# Patient Record
Sex: FEMALE | Race: WHITE | NOT HISPANIC OR LATINO | Employment: FULL TIME | URBAN - METROPOLITAN AREA
[De-identification: names, ages, dates, MRNs, and addresses within clinical notes are randomized per-mention and may not be internally consistent; named-entity substitution may affect disease eponyms.]

---

## 2024-07-01 ENCOUNTER — MEDICAL CORRESPONDENCE (OUTPATIENT)
Dept: HEALTH INFORMATION MANAGEMENT | Facility: CLINIC | Age: 34
End: 2024-07-01
Payer: COMMERCIAL

## 2024-07-02 ENCOUNTER — TRANSCRIBE ORDERS (OUTPATIENT)
Dept: OTHER | Age: 34
End: 2024-07-02

## 2024-07-02 DIAGNOSIS — I73.00 RAYNAUD'S DISEASE WITHOUT GANGRENE: Primary | ICD-10-CM

## 2024-10-06 ENCOUNTER — HEALTH MAINTENANCE LETTER (OUTPATIENT)
Age: 34
End: 2024-10-06

## 2024-10-18 ENCOUNTER — OFFICE VISIT (OUTPATIENT)
Dept: RHEUMATOLOGY | Facility: CLINIC | Age: 34
End: 2024-10-18
Attending: STUDENT IN AN ORGANIZED HEALTH CARE EDUCATION/TRAINING PROGRAM
Payer: COMMERCIAL

## 2024-10-18 VITALS
HEIGHT: 57 IN | WEIGHT: 158 LBS | TEMPERATURE: 97.1 F | DIASTOLIC BLOOD PRESSURE: 109 MMHG | SYSTOLIC BLOOD PRESSURE: 146 MMHG | BODY MASS INDEX: 34.09 KG/M2 | HEART RATE: 92 BPM | OXYGEN SATURATION: 97 %

## 2024-10-18 DIAGNOSIS — I73.00 RAYNAUD'S DISEASE WITHOUT GANGRENE: ICD-10-CM

## 2024-10-18 DIAGNOSIS — G43.809 OTHER MIGRAINE WITHOUT STATUS MIGRAINOSUS, NOT INTRACTABLE: Primary | ICD-10-CM

## 2024-10-18 PROCEDURE — 99417 PROLNG OP E/M EACH 15 MIN: CPT | Performed by: STUDENT IN AN ORGANIZED HEALTH CARE EDUCATION/TRAINING PROGRAM

## 2024-10-18 PROCEDURE — G2211 COMPLEX E/M VISIT ADD ON: HCPCS | Performed by: STUDENT IN AN ORGANIZED HEALTH CARE EDUCATION/TRAINING PROGRAM

## 2024-10-18 PROCEDURE — 99214 OFFICE O/P EST MOD 30 MIN: CPT | Performed by: STUDENT IN AN ORGANIZED HEALTH CARE EDUCATION/TRAINING PROGRAM

## 2024-10-18 PROCEDURE — 99205 OFFICE O/P NEW HI 60 MIN: CPT | Performed by: STUDENT IN AN ORGANIZED HEALTH CARE EDUCATION/TRAINING PROGRAM

## 2024-10-18 RX ORDER — OMEPRAZOLE 40 MG/1
CAPSULE, DELAYED RELEASE ORAL
COMMUNITY
Start: 2024-08-12

## 2024-10-18 RX ORDER — TACROLIMUS 1 MG/G
OINTMENT TOPICAL
COMMUNITY
Start: 2024-06-05

## 2024-10-18 RX ORDER — DUPILUMAB 300 MG/2ML
300 INJECTION, SOLUTION SUBCUTANEOUS
COMMUNITY
Start: 2024-08-23

## 2024-10-18 RX ORDER — MONTELUKAST SODIUM 10 MG/1
10 TABLET ORAL DAILY
COMMUNITY
Start: 2024-06-05

## 2024-10-18 RX ORDER — SIMETHICONE 125 MG
120 TABLET,CHEWABLE ORAL
COMMUNITY
Start: 2024-08-12

## 2024-10-18 RX ORDER — ZOLMITRIPTAN 2.5 MG/1
TABLET, ORALLY DISINTEGRATING ORAL
COMMUNITY
Start: 2024-08-01

## 2024-10-18 RX ORDER — TRAZODONE HYDROCHLORIDE 50 MG/1
TABLET, FILM COATED ORAL
COMMUNITY
Start: 2024-07-16

## 2024-10-18 RX ORDER — CARVEDILOL 12.5 MG/1
TABLET ORAL
COMMUNITY
Start: 2024-05-21

## 2024-10-18 RX ORDER — CETIRIZINE HYDROCHLORIDE 10 MG/1
2 TABLET ORAL DAILY
COMMUNITY
Start: 2024-06-05

## 2024-10-18 RX ORDER — LOSARTAN POTASSIUM 50 MG/1
50 TABLET ORAL DAILY
COMMUNITY
Start: 2024-09-03

## 2024-10-18 ASSESSMENT — PAIN SCALES - GENERAL: PAINLEVEL: MILD PAIN (3)

## 2024-10-18 NOTE — PROGRESS NOTES
NEW PATIENT RHEUMATOLOGY VISIT     Assessment & Plan     Problem List    Anxiety   Asthma   Allergies   Migraines   Duane syndrome   horizontal strabismus, since childhood   Eczema   Essential hypertension 11/20/2018   GERD (gastroesophageal reflux disease)   Major depression, recurrent (**) 7/17/2015   Polycystic kidney 8/24/2015   Polycystic ovarian disease   Irritable Bowel Syndrome   Gallstones     Concern for underlying autoimmune disease  Comment: Patient presents for evaluation of a multitude of symptoms with question of whether these could all be explained by an underlying diagnosis of autoimmune disease.  Her symptoms consist of complex migraines, hypermobility, history of multiple dislocations, eczema, multiple allergies, dental issues, easy bruising, Raynaud's, GERD, irritable bowel syndrome, difficulties with temperature regulation, and more recently with bladder/pelvic pain. She has been assessed by rheumatologist previously [facility] [place] with workup revealing +MARIKA 1:160, Neg/normal RF, CCP, centromere, SCL 70, RNP, Moctezuma, SSA, SSB.     Of her symptoms her Raynaud's and GERD could be seen in limited scleroderma.  However, she does not have other features of this disease including no sclerodactyly, no calcinosis, and no telangiectasias on exam, and serological workup for scleroderma has been negative.  I do not feel that her clinical picture fits with an underlying systemic autoimmune disease at this time, I would not recommend additional workup.    Plan:  -If she continues to have bladder/pelvic pain, I recommended that she discuss urology referral with her primary care physician and/or pelvic floor physical therapy  -We reviewed signs and symptoms of common autoimmune diseases such as lupus and rheumatoid arthritis to be on look out for in case she develop new/changing symptoms that would warrant reevaluation.  -No further workup recommended at this time  -Conservative management for Raynaud's.  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  Did not have time today to do a full assessment for this.  Plan:   -We discussed today that hypermobility can predispose people to dislocations and joint pains.  However, she does not have significant arthralgias at this time.  -Given concern for cervical instability causing her migraines I have ordered flexion-extension cervical spine x-rays today.  If normal, I recommended that she discuss if more sensitive imaging is warranted with her neurologist.  -Will bring her back next week to do a dedicated assessment for EDS    Orders Placed This Encounter   Procedures    XR Cervical Spine Comp w Obl & Flex/Ext        The longitudinal plan of care for the diagnosis(es)/condition(s) as documented were addressed during this visit. Due to the added complexity in care, I will continue to support Melba in the subsequent management and with ongoing continuity of care.      80 minutes spent on the day of the encounter doing chart review, history and exam, counseling and documentation.     Subjective         HPI    Melba Orlando is referred for evaluation of a variety of symptoms with concern for possible unifying diagnosis.     Has complex migraines with stroke like symptoms for about a year. Is seeing migraine specialist at [facility] [place]. Was told this may be due to cervical neck instability. Has TMJ dysfunction.     Has terrible dental health per patient, has receeding gums and mobile teeth. Does report some dry mouth. She is a mouth breather due to a previous nose injury. Does not have a high cavity burden, but does gets a lot of calcium build up on her teeth for which she has to see her dentist every 3 months for removal. Has a deviated septum     Has done allergy shots and is on  "dupixent for her eczema and things have been better from that standpoint.     Reports GERD, IBD and frequent vomiting and bloating. Has been assessed by [facility] and diagnosed with IBS. Has had a colonoscopy and endoscopy. Reports her GERD is pretty significant and not well controlled.     Has genetic polycystic kidney disease and PCOS and is s/p hysterectomy due to endometrial cells growing on the outside of her cervix and bleeding.     Reports she bruises very easily and unexplained stretch markers.   Reports she developed Raynaud's in her hands and feet starting in her 20s. Reports her raynaud's has gotten worse after discontinuing her CCB and switched to a beta blocker. Raynaud's is associated with numbness/tingling and stiffness. Last winter had some blisters on the tips of her fingers.   No calcinosis.     Has been starting to feel painful to have a full bladder and will get to the point where it is painful to sit up straight or lift her legs. Has messaged her PCP, renal and GI regarding this. Happened for the first time 2 months ago and then again last week. Had a recent UA without signs of UTI.     Has a history of kidney stones.     Reports she has poor temperature regulation.   Describes \"cement limbs\". When she's tired it feels like her hands, forearms and shins and feet feel very heavy and hard to move. She reports chronic fatigue and poor sleep. She had a sleep study which showed borderline BETHANY. She is on trazodone for difficulty falling asleep. Reports she gets lightheaded and gets alternating hot and cold if she tries to carry something heavy upstairs or if she tries to stand up quickly. Gets buzzy sensation in her head if she lays on her stomach for a long time and then gets up.     Reports she had multiple dislocations as a child. Multiple shoulder dislocations, and hip dyslocation. No history of prolapse. No personal or family history of aneurysms.     If she lays flat for too long, feels like her " hip gets out of the socket and has to pop it back in.     Has very sensitive skin.     Surgical hysterectomy   Top surgery due to recurrent breast cancer in family   Hysterectomy     Dad with B cell lymphoma, multiple family members with breast cancer.   Dad with polycystic kidney disease and skin cancer   Both half sisters with PCOS  Paternal grandfather  of stroke or aneurysms         Lab and Imaging review:    I reviewed recent labs and imaging includin/2022  +MARIKA 1:160,   Neg/normal RF, Centromere, SCL 70, RNP, Moctez[place], SSA, SSB CCP       Current Outpatient Medications:     carvedilol (COREG) 12.5 MG tablet, TAKE 1 TABLET(12.5 MG) BY MOUTH TWICE DAILY, Disp: , Rfl:     cetirizine (ZYRTEC) 10 MG tablet, Take 2 tablets by mouth daily., Disp: , Rfl:     dupilumab (DUPIXENT) 300 MG/2ML prefilled syringe, Inject 300 mg subcutaneously., Disp: , Rfl:     losartan (COZAAR) 50 MG tablet, Take 50 mg by mouth daily., Disp: , Rfl:     mometasone-formoterol (DULERA) 100-5 MCG/ACT inhaler, Inhale 2 puffs into the lungs., Disp: , Rfl:     montelukast (SINGULAIR) 10 MG tablet, Take 10 mg by mouth daily., Disp: , Rfl:     omeprazole (PRILOSEC) 40 MG DR capsule, Indications: HeartburnPlease take one capsule at bedtime, at least 2 hours after eating., Disp: , Rfl:     rimegepant (NURTEC) 75 MG ODT tablet, Take 1 tab at onset of migraine.  Can repeat a dose after 24 hours if needed., Disp: , Rfl:     simethicone (MYLICON) 125 MG chewable tablet, Take 120 mg by mouth., Disp: , Rfl:     tacrolimus (PROTOPIC) 0.1 % external ointment, Indications: Atopic DermatitisApply topically twice a day as needed on rash for itching/rash, Disp: , Rfl:     Tolvaptan 90 & 30 MG TBPK, Take 120 mg by mouth., Disp: , Rfl:     traZODone (DESYREL) 50 MG tablet, TAKE  1TO 2 TABLET BY MOUTH EVERY NIGHT 1 HOUR BEFORE BEDTIME AS NEEDED FOR SLEEP, Disp: , Rfl:     ZOLMitriptan (ZOMIG-ZMT) 2.5 MG ODT, 1-2 tabs at onset of headache.  Can repeat 1 dose  "in 2 hours if needed., Disp: , Rfl:   Allergies:  Allergies   Allergen Reactions    Benadryl [Diphenhydramine]      Medical Hx:  No past medical history on file.  Surgical Hx:  No past surgical history on file.  Family Hx:  No family history on file.  Social Hx:        Objective   Physical Exam   BP (!) 146/109   Pulse 92   Temp 97.1  F (36.2  C) (Oral)   Ht 1.448 m (4' 9\")   Wt 71.7 kg (158 lb)   SpO2 97%   BMI 34.19 kg/m    General: alert, well appearing, no distress  HEENT: no alopecia, clear conjunctiva, high arch palate, no oral or nasal ulcers, no cervical lymphadenopathy  Cardiac: normal rate and rhythm, no murmur, rubs or gallops   Pulm: normal respiratory effort, clear to auscultation bilaterally   MSK: Hand, wrist, elbow, and shoulder joints without evidence of synovitis or effusion. Intact and nonpainful range of motion. No Heberden/Adrianna nodes. Foot/ankle/knee/hip joints without erythema/effusion/tenderness to palpation and with intact nonpainful range of motion.   Beighton score 9/9, knees, elbows, thumb flex, pinky exten, hands to floor   >3cm at neck, elbow and knees, and 1.5cm at forearm and hand   Skin: No rashes, warm and well-perfused. No nail pitting, digital ulceration, dactylitis, or telangiectasias. No subcutaneous nodules. No velvety skin, no hyperextension of skin.                Afshan Ibarra MD  Rheumatology     "

## 2024-10-18 NOTE — LETTER
10/18/2024       RE: Melba Orlando  [address]  [place] [address]     Dear Colleague,    Thank you for referring your patient, Melba Orlando, to the [facility] RHEUMATOLOGY at [facility]. Please see a copy of my visit note below.    NEW PATIENT RHEUMATOLOGY VISIT     Assessment & Plan    Problem List    Anxiety   Asthma   Allergies   Migraines   Duane syndrome   horizontal strabismus, since childhood   Eczema   Essential hypertension 11/20/2018   GERD (gastroesophageal reflux disease)   Major depression, recurrent (**) 7/17/2015   Polycystic kidney 8/24/2015   Polycystic ovarian disease   Irritable Bowel Syndrome   Gallstones     Concern for underlying autoimmune disease  Comment: Patient presents for evaluation of a multitude of symptoms with question of whether these could all be explained by an underlying diagnosis of autoimmune disease.  Her symptoms consist of complex migraines, hypermobility, history of multiple dislocations, eczema, multiple allergies, dental issues, easy bruising, Raynaud's, GERD, irritable bowel syndrome, difficulties with temperature regulation, and more recently with bladder/pelvic pain. She has been assessed by rheumatologist previously [facility] [place] with workup revealing +MARIKA 1:160, Neg/normal RF, CCP, centromere, SCL 70, RNP, Moct[place], SSA, SSB.     Of her symptoms her Raynaud's and GERD could be seen in limited scleroderma.  However, she does not have other features of this disease including no sclerodactyly, no calcinosis, and no telangiectasias on exam, and serological workup for scleroderma has been negative.  I do not feel that her clinical picture fits with an underlying systemic autoimmune disease at this time, I would not recommend additional workup.    Plan:  -If she continues to have bladder/pelvic pain, I recommended that she discuss urology referral with her primary care physician and/or pelvic floor physical therapy  -We  reviewed signs and symptoms of common autoimmune diseases such as lupus and rheumatoid arthritis to be on look out for in case she develop new/changing symptoms that would warrant reevaluation.  -No further workup recommended at this time  -Conservative management for Raynaud's.  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  Did not have time today to do a full assessment for this.  Plan:   -We discussed today that hypermobility can predispose people to dislocations and joint pains.  However, she does not have significant arthralgias at this time.  -Given concern for cervical instability causing her migraines I have ordered flexion-extension cervical spine x-rays today.  If normal, I recommended that she discuss if more sensitive imaging is warranted with her neurologist.  -Will bring her back next week to do a dedicated assessment for EDS    Orders Placed This Encounter   Procedures     XR Cervical Spine Comp w Obl & Flex/Ext        The longitudinal plan of care for the diagnosis(es)/condition(s) as documented were addressed during this visit. Due to the added complexity in care, I will continue to support Melba in the subsequent management and with ongoing continuity of care.      80 minutes spent on the day of the encounter doing chart review, history and exam, counseling and documentation.     Subjective        HPI    Melba Orlando is referred for evaluation of a variety of symptoms with concern for possible unifying diagnosis.     Has complex migraines with stroke like symptoms for about a year. Is seeing [place] [facility] [place] Was told this may be due to cervical neck instability. Has TMJ dysfunction.     Has terrible dental health per patient, has receeding gums and mobile teeth. Does  "report some dry mouth. She is a mouth breather due to a previous nose injury. Does not have a high cavity burden, but does gets a lot of calcium build up on her teeth for which she has to see her dentist every 3 months for removal. Has a deviated septum     Has done allergy shots and is on dupixent for her eczema and things have been better from that standpoint.     Reports GERD, IBD and frequent vomiting and bloating. Has been assessed by [facility] and diagnosed with IBS. Has had a colonoscopy and endoscopy. Reports her GERD is pretty significant and not well controlled.     Has genetic polycystic kidney disease and PCOS and is s/p hysterectomy due to endometrial cells growing on the outside of her cervix and bleeding.     Reports she bruises very easily and unexplained stretch markers.   Reports she developed Raynaud's in her hands and feet starting in her 20s. Reports her raynaud's has gotten worse after discontinuing her CCB and switched to a beta blocker. Raynaud's is associated with numbness/tingling and stiffness. Last winter had some blisters on the tips of her fingers.   No calcinosis.     Has been starting to feel painful to have a full bladder and will get to the point where it is painful to sit up straight or lift her legs. Has messaged her PCP, renal and GI regarding this. Happened for the first time 2 months ago and then again last week. Had a recent UA without signs of UTI.     Has a history of kidney stones.     Reports she has poor temperature regulation.   Describes \"cement limbs\". When she's tired it feels like her hands, forearms and shins and feet feel very heavy and hard to move. She reports chronic fatigue and poor sleep. She had a sleep study which showed borderline BETHANY. She is on trazodone for difficulty falling asleep. Reports she gets lightheaded and gets alternating hot and cold if she tries to carry something heavy upstairs or if she tries to stand up quickly. Gets buzzy sensation in her " head if she lays on her stomach for a long time and then gets up.     Reports she had multiple dislocations as a child. Multiple shoulder dislocations, and hip dyslocation. No history of prolapse. No personal or family history of aneurysms.     If she lays flat for too long, feels like her hip gets out of the socket and has to pop it back in.     Has very sensitive skin.     Surgical hysterectomy   Top surgery due to recurrent breast cancer in family   Hysterectomy     Dad with B cell lymphoma, multiple family members with breast cancer.   Dad with polycystic kidney disease and skin cancer   Both half sisters with PCOS  Paternal grandfather  of stroke or aneurysms         Lab and Imaging review:    I reviewed recent labs and imaging includin/2022  +MARIKA 1:160,   Neg/normal RF, Centromere, SCL 70, RNP, Moct[place], SSA, SSB CCP       Current Outpatient Medications:      carvedilol (COREG) 12.5 MG tablet, TAKE 1 TABLET(12.5 MG) BY MOUTH TWICE DAILY, Disp: , Rfl:      cetirizine (ZYRTEC) 10 MG tablet, Take 2 tablets by mouth daily., Disp: , Rfl:      dupilumab (DUPIXENT) 300 MG/2ML prefilled syringe, Inject 300 mg subcutaneously., Disp: , Rfl:      losartan (COZAAR) 50 MG tablet, Take 50 mg by mouth daily., Disp: , Rfl:      mometasone-formoterol (DULERA) 100-5 MCG/ACT inhaler, Inhale 2 puffs into the lungs., Disp: , Rfl:      montelukast (SINGULAIR) 10 MG tablet, Take 10 mg by mouth daily., Disp: , Rfl:      omeprazole (PRILOSEC) 40 MG DR capsule, Indications: HeartburnPlease take one capsule at bedtime, at least 2 hours after eating., Disp: , Rfl:      rimegepant (NURTEC) 75 MG ODT tablet, Take 1 tab at onset of migraine.  Can repeat a dose after 24 hours if needed., Disp: , Rfl:      simethicone (MYLICON) 125 MG chewable tablet, Take 120 mg by mouth., Disp: , Rfl:      tacrolimus (PROTOPIC) 0.1 % external ointment, Indications: Atopic DermatitisApply topically twice a day as needed on rash for itching/rash, Disp:  ", Rfl:      Tolvaptan 90 & 30 MG TBPK, Take 120 mg by mouth., Disp: , Rfl:      traZODone (DESYREL) 50 MG tablet, TAKE  1TO 2 TABLET BY MOUTH EVERY NIGHT 1 HOUR BEFORE BEDTIME AS NEEDED FOR SLEEP, Disp: , Rfl:      ZOLMitriptan (ZOMIG-ZMT) 2.5 MG ODT, 1-2 tabs at onset of headache.  Can repeat 1 dose in 2 hours if needed., Disp: , Rfl:   Allergies:  Allergies   Allergen Reactions     Benadryl [Diphenhydramine]      Medical Hx:  No past medical history on file.  Surgical Hx:  No past surgical history on file.  Family Hx:  No family history on file.  Social Hx:        Objective  Physical Exam   BP (!) 146/109   Pulse 92   Temp 97.1  F (36.2  C) (Oral)   Ht 1.448 m (4' 9\")   Wt 71.7 kg (158 lb)   SpO2 97%   BMI 34.19 kg/m    General: alert, well appearing, no distress  HEENT: no alopecia, clear conjunctiva, high arch palate, no oral or nasal ulcers, no cervical lymphadenopathy  Cardiac: normal rate and rhythm, no murmur, rubs or gallops   Pulm: normal respiratory effort, clear to auscultation bilaterally   MSK: Hand, wrist, elbow, and shoulder joints without evidence of synovitis or effusion. Intact and nonpainful range of motion. No Heberden/Adrianna nodes. Foot/ankle/knee/hip joints without erythema/effusion/tenderness to palpation and with intact nonpainful range of motion.   Beighton score 9/9, knees, elbows, thumb flex, pinky exten, hands to floor   >3cm at neck, elbow and knees, and 1.5cm at forearm and hand   Skin: No rashes, warm and well-perfused. No nail pitting, digital ulceration, dactylitis, or telangiectasias. No subcutaneous nodules. No velvety skin, no hyperextension of skin.                Afshan Ibarra MD  Rheumatology       Again, thank you for allowing me to participate in the care of your patient.      Sincerely,    Afshan Ibarra MD    "

## 2024-10-24 NOTE — PROGRESS NOTES
RETURN PATIENT RHEUMATOLOGY VISIT     Assessment & Plan     Problem List    Anxiety   Asthma   Allergies   Migraines   Duane syndrome   horizontal strabismus, since childhood   Eczema   Essential hypertension 11/20/2018   GERD (gastroesophageal reflux disease)   Major depression, recurrent (**) 7/17/2015   Polycystic kidney 8/24/2015   Polycystic ovarian disease   Irritable Bowel Syndrome   Gallstones     Concern for underlying autoimmune disease  Comment: Patient presents for evaluation of a multitude of symptoms with question of whether these could all be explained by an underlying diagnosis of autoimmune disease.  Her symptoms consist of complex migraines, hypermobility, history of multiple dislocations, eczema, multiple allergies, dental issues, easy bruising, Raynaud's, GERD, irritable bowel syndrome, difficulties with temperature regulation, and more recently with bladder/pelvic pain. She has been assessed by rheumatologist previously [facility] with workup revealing +MARIKA 1:160, Neg/normal RF, CCP, centromere, SCL 70, RNP, Moct[place]uma, SSA, SSB.     Of her symptoms her Raynaud's and GERD could be seen in limited scleroderma.  However, she does not have other features of this disease including no sclerodactyly, no calcinosis, and no telangiectasias on exam, and serological workup for scleroderma has been negative.  I do not feel that her clinical picture fits with an underlying systemic autoimmune disease at this time, I would not recommend additional workup.    Plan:  -If she continues to have bladder/pelvic pain, I recommended that she discuss urology referral with her primary care physician and/or pelvic floor physical therapy  -We reviewed signs and symptoms of common autoimmune diseases such as lupus and rheumatoid arthritis to be on look out for in case she develop new/changing symptoms that would warrant reevaluation.  -No further workup recommended at this time  -Conservative management for Raynaud's.  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     Plan:   -Reviewed that management is for hEDS is supportive in nature. She is already aware of the EDS society website and their supportive services as well as the physical therapy and occupational therapy that can be helpful. (She is an occupational therapist).   -for future surgeries and procedures, she may be higher risk for poor wound healing   -echocardiogram today to assess for MVp and aortic root dilation   -given hypermobility of her neck on exam and recurrent difficult to control headaches, I have placed a spine referral  -will follow up on cervical spine x-rays     HTN  Polycystic kidney   Elevated BP  Comment: Recently increased BP meds, BP is in 150s/100s   Plan:   She will reach out to her nephrologist re continued elevated BP     Orders Placed This Encounter   Procedures    Spine  Referral    Echocardiogram Complete          30 minutes spent on the day of the encounter doing chart review, history and exam, counseling and documentation.     Subjective     Returns today to do dedicated EDS evaluation.     HPI    Melba Orlando is referred for evaluation of a variety of symptoms with concern for possible unifying diagnosis.     Has complex migraines with stroke like symptoms for about a year. Is seeing migraine specialist at [facility] [place]. Was told this may be due to cervical neck instability. Has TMJ dysfunction.     Has terrible dental health per patient, has receeding gums and mobile teeth. Does report some dry mouth. She is a mouth breather due to a previous nose injury. Does not have a high cavity burden, but does gets a lot of calcium build up  "on her teeth for which she has to see her dentist every 3 months for removal. Has a deviated septum     Has done allergy shots and is on dupixent for her eczema and things have been better from that standpoint.     Reports GERD, IBD and frequent vomiting and bloating. Has been assessed by [facility] and diagnosed with IBS. Has had a colonoscopy and endoscopy. Reports her GERD is pretty significant and not well controlled.     Has genetic polycystic kidney disease and PCOS and is s/p hysterectomy due to endometrial cells growing on the outside of her cervix and bleeding.     Reports she bruises very easily and unexplained stretch markers.   Reports she developed Raynaud's in her hands and feet starting in her 20s. Reports her raynaud's has gotten worse after discontinuing her CCB and switched to a beta blocker. Raynaud's is associated with numbness/tingling and stiffness. Last winter had some blisters on the tips of her fingers.   No calcinosis.     Has been starting to feel painful to have a full bladder and will get to the point where it is painful to sit up straight or lift her legs. Has messaged her PCP, renal and GI regarding this. Happened for the first time 2 months ago and then again last week. Had a recent UA without signs of UTI.     Has a history of kidney stones.     Reports she has poor temperature regulation.   Describes \"cement limbs\". When she's tired it feels like her hands, forearms and shins and feet feel very heavy and hard to move. She reports chronic fatigue and poor sleep. She had a sleep study which showed borderline BETHANY. She is on trazodone for difficulty falling asleep. Reports she gets lightheaded and gets alternating hot and cold if she tries to carry something heavy upstairs or if she tries to stand up quickly. Gets buzzy sensation in her head if she lays on her stomach for a long time and then gets up.     Reports she had multiple dislocations as a child. Multiple shoulder dislocations, and " hip dyslocation. No history of prolapse. No personal or family history of aneurysms.     If she lays flat for too long, feels like her hip gets out of the socket and has to pop it back in.     Has very sensitive skin.     Surgical hysterectomy   Top surgery due to recurrent breast cancer in family   Hysterectomy     Dad with B cell lymphoma, multiple family members with breast cancer.   Dad with polycystic kidney disease and skin cancer   Both half sisters with PCOS  Paternal grandfather  of stroke or aneurysms         Lab and Imaging review:    I reviewed recent labs and imaging includin/2022  +MARIKA 1:160,   Neg/normal RF, Centromere, SCL 70, RNP, Moct[place], SSA, SSB CCP       Current Outpatient Medications:     carvedilol (COREG) 12.5 MG tablet, TAKE 1 TABLET(12.5 MG) BY MOUTH TWICE DAILY, Disp: , Rfl:     cetirizine (ZYRTEC) 10 MG tablet, Take 2 tablets by mouth daily., Disp: , Rfl:     dupilumab (DUPIXENT) 300 MG/2ML prefilled syringe, Inject 300 mg subcutaneously., Disp: , Rfl:     losartan (COZAAR) 50 MG tablet, Take 50 mg by mouth daily., Disp: , Rfl:     mometasone-formoterol (DULERA) 100-5 MCG/ACT inhaler, Inhale 2 puffs into the lungs., Disp: , Rfl:     montelukast (SINGULAIR) 10 MG tablet, Take 10 mg by mouth daily., Disp: , Rfl:     omeprazole (PRILOSEC) 40 MG DR capsule, Indications: HeartburnPlease take one capsule at bedtime, at least 2 hours after eating., Disp: , Rfl:     rimegepant (NURTEC) 75 MG ODT tablet, Take 1 tab at onset of migraine.  Can repeat a dose after 24 hours if needed., Disp: , Rfl:     simethicone (MYLICON) 125 MG chewable tablet, Take 120 mg by mouth., Disp: , Rfl:     tacrolimus (PROTOPIC) 0.1 % external ointment, Indications: Atopic DermatitisApply topically twice a day as needed on rash for itching/rash, Disp: , Rfl:     Tolvaptan 90 & 30 MG TBPK, Take 120 mg by mouth., Disp: , Rfl:     traZODone (DESYREL) 50 MG tablet, TAKE  1TO 2 TABLET BY MOUTH EVERY NIGHT 1 HOUR BEFORE  BEDTIME AS NEEDED FOR SLEEP, Disp: , Rfl:     ZOLMitriptan (ZOMIG-ZMT) 2.5 MG ODT, 1-2 tabs at onset of headache.  Can repeat 1 dose in 2 hours if needed., Disp: , Rfl:   Allergies:  Allergies   Allergen Reactions    Benadryl [Diphenhydramine]      Medical Hx:  No past medical history on file.  Surgical Hx:  No past surgical history on file.  Family Hx:  No family history on file.  Social Hx:  Social History     Tobacco Use    Smoking status: Never    Smokeless tobacco: Never   Vaping Use    Vaping status: Never Used        Objective   Physical Exam   BP (!) 152/104 (BP Location: Right arm, Patient Position: Sitting, Cuff Size: Adult Regular)   Pulse 87   Wt 71.7 kg (158 lb)   SpO2 98%   BMI 34.19 kg/m    General: alert, well appearing, no distress  HEENT: no alopecia, clear conjunctiva, high arch palate, dental crowding, no oral or nasal ulcers, no cervical lymphadenopathy  Cardiac: normal rate and rhythm, no murmur, rubs or gallops   Pulm: normal respiratory effort, clear to auscultation bilaterally   MSK: Hand, wrist, elbow, and shoulder joints without evidence of synovitis or effusion. Intact and nonpainful range of motion. No Heberden/Adrianna nodes. Foot/ankle/knee/hip joints without erythema/effusion/tenderness to palpation and with intact nonpainful range of motion.   Beighton score 8/9, knees, elbows, thumb flex, pinky exten x1 (broke other finger), hands to floor   >3cm at neck, elbow and knees, and 1.5cm at forearm and hand   Skin: velvety, soft skin, +bilateral piezogenic papules over heels, striae over waist and back              Afshan Ibarra MD  Rheumatology

## 2024-10-25 ENCOUNTER — OFFICE VISIT (OUTPATIENT)
Dept: RHEUMATOLOGY | Facility: CLINIC | Age: 34
End: 2024-10-25
Attending: STUDENT IN AN ORGANIZED HEALTH CARE EDUCATION/TRAINING PROGRAM
Payer: COMMERCIAL

## 2024-10-25 ENCOUNTER — HOSPITAL ENCOUNTER (OUTPATIENT)
Dept: GENERAL RADIOLOGY | Facility: CLINIC | Age: 34
Discharge: HOME | End: 2024-10-25
Attending: STUDENT IN AN ORGANIZED HEALTH CARE EDUCATION/TRAINING PROGRAM
Payer: COMMERCIAL

## 2024-10-25 VITALS
DIASTOLIC BLOOD PRESSURE: 104 MMHG | SYSTOLIC BLOOD PRESSURE: 152 MMHG | WEIGHT: 158 LBS | OXYGEN SATURATION: 98 % | HEART RATE: 87 BPM | BODY MASS INDEX: 34.19 KG/M2

## 2024-10-25 DIAGNOSIS — Q79.60 EDS (EHLERS-DANLOS SYNDROME): Primary | ICD-10-CM

## 2024-10-25 DIAGNOSIS — G43.809 OTHER MIGRAINE WITHOUT STATUS MIGRAINOSUS, NOT INTRACTABLE: ICD-10-CM

## 2024-10-25 PROCEDURE — 72050 X-RAY EXAM NECK SPINE 4/5VWS: CPT | Mod: 26 | Performed by: RADIOLOGY

## 2024-10-25 PROCEDURE — 72050 X-RAY EXAM NECK SPINE 4/5VWS: CPT

## 2024-10-25 PROCEDURE — 99214 OFFICE O/P EST MOD 30 MIN: CPT | Performed by: STUDENT IN AN ORGANIZED HEALTH CARE EDUCATION/TRAINING PROGRAM

## 2024-10-25 PROCEDURE — 99213 OFFICE O/P EST LOW 20 MIN: CPT | Performed by: STUDENT IN AN ORGANIZED HEALTH CARE EDUCATION/TRAINING PROGRAM

## 2024-10-25 ASSESSMENT — PAIN SCALES - GENERAL: PAINLEVEL_OUTOF10: MODERATE PAIN (4)

## 2024-10-25 NOTE — NURSING NOTE
Chief Complaint   Patient presents with    RECHECK     BP (!) 152/104 (BP Location: Right arm, Patient Position: Sitting, Cuff Size: Adult Regular)   Pulse 87   Wt 71.7 kg (158 lb)   SpO2 98%   BMI 34.19 kg/m

## 2024-10-25 NOTE — LETTER
10/25/2024       RE: Melba Orlando  [address]  [place] [address]     Dear Colleague,    Thank you for referring your patient, Melba Orlando, to the [facility] RHEUMATOLOGY at [facility]. Please see a copy of my visit note below.    RETURN PATIENT RHEUMATOLOGY VISIT     Assessment & Plan    Problem List    Anxiety   Asthma   Allergies   Migraines   Duane syndrome   horizontal strabismus, since childhood   Eczema   Essential hypertension 11/20/2018   GERD (gastroesophageal reflux disease)   Major depression, recurrent (**) 7/17/2015   Polycystic kidney 8/24/2015   Polycystic ovarian disease   Irritable Bowel Syndrome   Gallstones     Concern for underlying autoimmune disease  Comment: Patient presents for evaluation of a multitude of symptoms with question of whether these could all be explained by an underlying diagnosis of autoimmune disease.  Her symptoms consist of complex migraines, hypermobility, history of multiple dislocations, eczema, multiple allergies, dental issues, easy bruising, Raynaud's, GERD, irritable bowel syndrome, difficulties with temperature regulation, and more recently with bladder/pelvic pain. She has been assessed by rheumatologist previously [facility] with workup revealing +MARIKA 1:160, Neg/normal RF, CCP, centromere, SCL 70, RNP, Moct[place], SSA, SSB.     Of her symptoms her Raynaud's and GERD could be seen in limited scleroderma.  However, she does not have other features of this disease including no sclerodactyly, no calcinosis, and no telangiectasias on exam, and serological workup for scleroderma has been negative.  I do not feel that her clinical picture fits with an underlying systemic autoimmune disease at this time, I would not recommend additional workup.    Plan:  -If she continues to have bladder/pelvic pain, I recommended that she discuss urology referral with her primary care physician and/or pelvic floor physical therapy  -We  reviewed signs and symptoms of common autoimmune diseases such as lupus and rheumatoid arthritis to be on look out for in case she develop new/changing symptoms that would warrant reevaluation.  -No further workup recommended at this time  -Conservative management for Raynaud's.  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     Plan:   -Reviewed that management is for hEDS is supportive in nature. She is already aware of the EDS society website and their supportive services as well as the physical therapy and occupational therapy that can be helpful. (She is an occupational therapist).   -for future surgeries and procedures, she may be higher risk for poor wound healing   -echocardiogram today to assess for MVp and aortic root dilation   -given hypermobility of her neck on exam and recurrent difficult to control headaches, I have placed a spine referral  -will follow up on cervical spine x-rays     HTN  Polycystic kidney   Elevated BP  Comment: Recently increased BP meds, BP is in 150s/100s   Plan:   She will reach out to her nephrologist re continued elevated BP     Orders Placed This Encounter   Procedures     Spine  Referral     Echocardiogram Complete          30 minutes spent on the day of the encounter doing chart review, history and exam, counseling and documentation.     Subjective    Returns today to do dedicated EDS evaluation.     GENARO    Melba Orlando is referred for evaluation of a variety of symptoms with concern for possible unifying diagnosis.     Has complex migraines with stroke like symptoms for about a year. Is seeing migraine specialist at [facility] [place]. Was told this may be due to  "cervical neck instability. Has TMJ dysfunction.     Has terrible dental health per patient, has receeding gums and mobile teeth. Does report some dry mouth. She is a mouth breather due to a previous nose injury. Does not have a high cavity burden, but does gets a lot of calcium build up on her teeth for which she has to see her dentist every 3 months for removal. Has a deviated septum     Has done allergy shots and is on dupixent for her eczema and things have been better from that standpoint.     Reports GERD, IBD and frequent vomiting and bloating. Has been assessed by [facility] and diagnosed with IBS. Has had a colonoscopy and endoscopy. Reports her GERD is pretty significant and not well controlled.     Has genetic polycystic kidney disease and PCOS and is s/p hysterectomy due to endometrial cells growing on the outside of her cervix and bleeding.     Reports she bruises very easily and unexplained stretch markers.   Reports she developed Raynaud's in her hands and feet starting in her 20s. Reports her raynaud's has gotten worse after discontinuing her CCB and switched to a beta blocker. Raynaud's is associated with numbness/tingling and stiffness. Last winter had some blisters on the tips of her fingers.   No calcinosis.     Has been starting to feel painful to have a full bladder and will get to the point where it is painful to sit up straight or lift her legs. Has messaged her PCP, renal and GI regarding this. Happened for the first time 2 months ago and then again last week. Had a recent UA without signs of UTI.     Has a history of kidney stones.     Reports she has poor temperature regulation.   Describes \"cement limbs\". When she's tired it feels like her hands, forearms and shins and feet feel very heavy and hard to move. She reports chronic fatigue and poor sleep. She had a sleep study which showed borderline BETHANY. She is on trazodone for difficulty falling asleep. Reports she gets lightheaded and gets " alternating hot and cold if she tries to carry something heavy upstairs or if she tries to stand up quickly. Gets buzzy sensation in her head if she lays on her stomach for a long time and then gets up.     Reports she had multiple dislocations as a child. Multiple shoulder dislocations, and hip dyslocation. No history of prolapse. No personal or family history of aneurysms.     If she lays flat for too long, feels like her hip gets out of the socket and has to pop it back in.     Has very sensitive skin.     Surgical hysterectomy   Top surgery due to recurrent breast cancer in family   Hysterectomy     Dad with B cell lymphoma, multiple family members with breast cancer.   Dad with polycystic kidney disease and skin cancer   Both half sisters with PCOS  Paternal grandfather  of stroke or aneurysms         Lab and Imaging review:    I reviewed recent labs and imaging includin/2022  +MARIKA 1:160,   Neg/normal RF, Centromere, SCL 70, RNP, Moct[place], SSA, SSB CCP       Current Outpatient Medications:      carvedilol (COREG) 12.5 MG tablet, TAKE 1 TABLET(12.5 MG) BY MOUTH TWICE DAILY, Disp: , Rfl:      cetirizine (ZYRTEC) 10 MG tablet, Take 2 tablets by mouth daily., Disp: , Rfl:      dupilumab (DUPIXENT) 300 MG/2ML prefilled syringe, Inject 300 mg subcutaneously., Disp: , Rfl:      losartan (COZAAR) 50 MG tablet, Take 50 mg by mouth daily., Disp: , Rfl:      mometasone-formoterol (DULERA) 100-5 MCG/ACT inhaler, Inhale 2 puffs into the lungs., Disp: , Rfl:      montelukast (SINGULAIR) 10 MG tablet, Take 10 mg by mouth daily., Disp: , Rfl:      omeprazole (PRILOSEC) 40 MG DR capsule, Indications: HeartburnPlease take one capsule at bedtime, at least 2 hours after eating., Disp: , Rfl:      rimegepant (NURTEC) 75 MG ODT tablet, Take 1 tab at onset of migraine.  Can repeat a dose after 24 hours if needed., Disp: , Rfl:      simethicone (MYLICON) 125 MG chewable tablet, Take 120 mg by mouth., Disp: , Rfl:       tacrolimus (PROTOPIC) 0.1 % external ointment, Indications: Atopic DermatitisApply topically twice a day as needed on rash for itching/rash, Disp: , Rfl:      Tolvaptan 90 & 30 MG TBPK, Take 120 mg by mouth., Disp: , Rfl:      traZODone (DESYREL) 50 MG tablet, TAKE  1TO 2 TABLET BY MOUTH EVERY NIGHT 1 HOUR BEFORE BEDTIME AS NEEDED FOR SLEEP, Disp: , Rfl:      ZOLMitriptan (ZOMIG-ZMT) 2.5 MG ODT, 1-2 tabs at onset of headache.  Can repeat 1 dose in 2 hours if needed., Disp: , Rfl:   Allergies:  Allergies   Allergen Reactions     Benadryl [Diphenhydramine]      Medical Hx:  No past medical history on file.  Surgical Hx:  No past surgical history on file.  Family Hx:  No family history on file.  Social Hx:  Social History     Tobacco Use     Smoking status: Never     Smokeless tobacco: Never   Vaping Use     Vaping status: Never Used        Objective  Physical Exam   BP (!) 152/104 (BP Location: Right arm, Patient Position: Sitting, Cuff Size: Adult Regular)   Pulse 87   Wt 71.7 kg (158 lb)   SpO2 98%   BMI 34.19 kg/m    General: alert, well appearing, no distress  HEENT: no alopecia, clear conjunctiva, high arch palate, dental crowding, no oral or nasal ulcers, no cervical lymphadenopathy  Cardiac: normal rate and rhythm, no murmur, rubs or gallops   Pulm: normal respiratory effort, clear to auscultation bilaterally   MSK: Hand, wrist, elbow, and shoulder joints without evidence of synovitis or effusion. Intact and nonpainful range of motion. No Heberden/Adrianna nodes. Foot/ankle/knee/hip joints without erythema/effusion/tenderness to palpation and with intact nonpainful range of motion.   Beighton score 8/9, knees, elbows, thumb flex, pinky exten x1 (broke other finger), hands to floor   >3cm at neck, elbow and knees, and 1.5cm at forearm and hand   Skin: velvety, soft skin, +bilateral piezogenic papules over heels, striae over waist and back              Afshan Ibarra MD  Rheumatology       Again,  thank you for allowing me to participate in the care of your patient.      Sincerely,    Afshan Ibarra MD

## 2024-10-28 ENCOUNTER — PATIENT OUTREACH (OUTPATIENT)
Dept: CARE COORDINATION | Facility: CLINIC | Age: 34
End: 2024-10-28
Payer: COMMERCIAL

## 2024-10-30 ENCOUNTER — PATIENT OUTREACH (OUTPATIENT)
Dept: CARE COORDINATION | Facility: CLINIC | Age: 34
End: 2024-10-30
Payer: COMMERCIAL